# Patient Record
(demographics unavailable — no encounter records)

---

## 2024-12-17 NOTE — CARDIOLOGY SUMMARY
[de-identified] : Normal sinus rhythm, RSR prime [de-identified] : - ECHOCARDIOGRAM: 7/26/2023, [facility], EF 55 to 60%, trace mitral regurgitation -COPD -Epilepsy -ADHD

## 2024-12-17 NOTE — REVIEW OF SYSTEMS
[Dyspnea on exertion] : dyspnea during exertion [Chest Discomfort] : chest discomfort [Negative] : Heme/Lymph [FreeTextEntry5] : Tachycardia

## 2024-12-17 NOTE — HISTORY OF PRESENT ILLNESS
[FreeTextEntry1] : The patient is a 40-year-old white female with a past medical history remarkable for COPD, epilepsy, and ADHD who presents for evaluation prior to resection of a basal cell carcinoma. The patient does not exercise.  She reports several hospitalizations for chest pain at [facility].  Medical records are unavailable.  Recent electrolytes and a recent CBC were reviewed on her telephone. The patient reports tachycardia and dyspnea. The patient smokes.  She consumes 1 cup of coffee per day and 3 cans of soda.  She does not exercise but reports that she is active caring for 3 children

## 2024-12-17 NOTE — DISCUSSION/SUMMARY
[EKG obtained to assist in diagnosis and management of assessed problem(s)] : EKG obtained to assist in diagnosis and management of assessed problem(s) [FreeTextEntry1] : Preoperative cardiovascular evaluation: Basal cell carcinoma In view of the patient's normal ejection fraction, adequate functional capacity, and the absence of unstable angina, congestive heart failure, or severe aortic stenosis, she is at average risk for perioperative cardiovascular morbidity and mortality, and cleared from a cardiology standpoint with the following recommendations: Perioperative beta-blockers, endocarditis prophylaxis, and a postoperative monitored bed are not indicated.  Chest pain/dyspnea Atypical.  No signs of fluid overload. Medical records requested from [facility]  Tachycardia Probably secondary to the patient's smoking, caffeine, and medical treatment of her ADHD in view of her echocardiographic results TFTs ordered  RTO after medical records are available from [facility]